# Patient Record
Sex: MALE | Race: WHITE | NOT HISPANIC OR LATINO
[De-identification: names, ages, dates, MRNs, and addresses within clinical notes are randomized per-mention and may not be internally consistent; named-entity substitution may affect disease eponyms.]

---

## 2017-12-14 ENCOUNTER — APPOINTMENT (OUTPATIENT)
Dept: OPHTHALMOLOGY | Facility: CLINIC | Age: 62
End: 2017-12-14
Payer: COMMERCIAL

## 2017-12-14 PROCEDURE — 92014 COMPRE OPH EXAM EST PT 1/>: CPT

## 2018-12-06 ENCOUNTER — APPOINTMENT (OUTPATIENT)
Dept: OPHTHALMOLOGY | Facility: CLINIC | Age: 63
End: 2018-12-06
Payer: COMMERCIAL

## 2018-12-06 DIAGNOSIS — H02.89 OTHER SPECIFIED DISORDERS OF EYELID: ICD-10-CM

## 2018-12-06 DIAGNOSIS — H26.9 UNSPECIFIED CATARACT: ICD-10-CM

## 2018-12-06 DIAGNOSIS — H43.393 OTHER VITREOUS OPACITIES, BILATERAL: ICD-10-CM

## 2018-12-06 PROCEDURE — 92014 COMPRE OPH EXAM EST PT 1/>: CPT

## 2019-12-11 ENCOUNTER — APPOINTMENT (OUTPATIENT)
Dept: OPHTHALMOLOGY | Facility: CLINIC | Age: 64
End: 2019-12-11
Payer: COMMERCIAL

## 2019-12-11 ENCOUNTER — NON-APPOINTMENT (OUTPATIENT)
Age: 64
End: 2019-12-11

## 2019-12-11 PROCEDURE — 92014 COMPRE OPH EXAM EST PT 1/>: CPT

## 2021-02-11 ENCOUNTER — TRANSCRIPTION ENCOUNTER (OUTPATIENT)
Age: 66
End: 2021-02-11

## 2021-02-11 ENCOUNTER — EMERGENCY (EMERGENCY)
Facility: HOSPITAL | Age: 66
LOS: 1 days | Discharge: HOME | End: 2021-02-11
Attending: EMERGENCY MEDICINE | Admitting: EMERGENCY MEDICINE
Payer: COMMERCIAL

## 2021-02-11 VITALS
SYSTOLIC BLOOD PRESSURE: 115 MMHG | TEMPERATURE: 101 F | HEART RATE: 70 BPM | RESPIRATION RATE: 16 BRPM | DIASTOLIC BLOOD PRESSURE: 72 MMHG | OXYGEN SATURATION: 97 %

## 2021-02-11 VITALS
OXYGEN SATURATION: 97 % | HEIGHT: 61 IN | HEART RATE: 83 BPM | WEIGHT: 175.05 LBS | TEMPERATURE: 98 F | RESPIRATION RATE: 18 BRPM | DIASTOLIC BLOOD PRESSURE: 75 MMHG | SYSTOLIC BLOOD PRESSURE: 128 MMHG

## 2021-02-11 DIAGNOSIS — R50.9 FEVER, UNSPECIFIED: ICD-10-CM

## 2021-02-11 DIAGNOSIS — U07.1 COVID-19: ICD-10-CM

## 2021-02-11 LAB — SARS-COV-2 RNA SPEC QL NAA+PROBE: POSITIVE

## 2021-02-11 PROCEDURE — M0239: CPT

## 2021-02-11 PROCEDURE — U0003: CPT

## 2021-02-11 PROCEDURE — 99284 EMERGENCY DEPT VISIT MOD MDM: CPT

## 2021-02-11 PROCEDURE — 99284 EMERGENCY DEPT VISIT MOD MDM: CPT | Mod: 25

## 2021-02-11 PROCEDURE — U0005: CPT

## 2021-02-11 RX ORDER — ACETAMINOPHEN 500 MG
650 TABLET ORAL ONCE
Refills: 0 | Status: COMPLETED | OUTPATIENT
Start: 2021-02-11 | End: 2021-02-11

## 2021-02-11 RX ORDER — SODIUM CHLORIDE 9 MG/ML
250 INJECTION INTRAMUSCULAR; INTRAVENOUS; SUBCUTANEOUS
Refills: 0 | Status: DISCONTINUED | OUTPATIENT
Start: 2021-02-11 | End: 2021-02-15

## 2021-02-11 RX ORDER — BAMLANIVIMAB 35 MG/ML
700 INJECTION, SOLUTION INTRAVENOUS ONCE
Refills: 0 | Status: COMPLETED | OUTPATIENT
Start: 2021-02-11 | End: 2021-02-11

## 2021-02-11 RX ADMIN — BAMLANIVIMAB 700 MILLIGRAM(S): 35 INJECTION, SOLUTION INTRAVENOUS at 22:42

## 2021-02-11 RX ADMIN — BAMLANIVIMAB 270 MILLIGRAM(S): 35 INJECTION, SOLUTION INTRAVENOUS at 21:35

## 2021-02-11 RX ADMIN — SODIUM CHLORIDE 25 MILLILITER(S): 9 INJECTION INTRAMUSCULAR; INTRAVENOUS; SUBCUTANEOUS at 21:35

## 2021-02-11 RX ADMIN — SODIUM CHLORIDE 250 MILLILITER(S): 9 INJECTION INTRAMUSCULAR; INTRAVENOUS; SUBCUTANEOUS at 23:42

## 2021-02-11 RX ADMIN — Medication 650 MILLIGRAM(S): at 21:26

## 2021-02-11 NOTE — ED ADULT NURSE REASSESSMENT NOTE - SYMPTOMS
none
pt states feels "great", pt stated "you know I had this pain/stiffness in my neck and now its all of a sudden gone and I have better range of motion."/none
none

## 2021-02-11 NOTE — ED PROVIDER NOTE - CLINICAL SUMMARY MEDICAL DECISION MAKING FREE TEXT BOX
Presents for MAB infusion after positive outpatient covid test, symptoms for 7 days. Well appearing, stable vs, no oxygen requirement.  Patient has history of COVID-19 symptoms for [7  ] days  Patient has high risk factors that include:  [  ] Chronic Kidney Disease [  ] Diabetes Mellitus [  ] Immune Suppressive [  ] Receiving Immunosuppressive Treatment [  ] BMI greater than or equal to 35 [X  ] Age greater than or equal 65 years    Additional Risk Factors (>55y)  [  ] Cardiovascular Disease [  ] HTN [  ] COPD/Other Chronic Respiratory Disease     Additional Risk Factors (12-17y):   [  ] Sickle Cell Disease [  ] Congenital/Acquired Heart Disease [  ] Neurodevelopmental Disorder [  ] Medical related technology dependence [  ] Asthma/Chronic Respiratory Disease [  ] Immune Suppressive Disease [  ] Receiving Immune Suppressive Therapy [  ] BMI greater than or equal to the 85th percentile    signed out to Dr. Velasco/ [facility] pending infusion and post infusion monitoring/documentation    Patient received prior COVID treatment that included [  ]  Patient provided explanation of monoclonal antibody infusion and in agreement with treatment plan. See consent form in medical record.

## 2021-02-11 NOTE — ED ADULT NURSE REASSESSMENT NOTE - NS ED NURSE REASSESS COMMENT FT1
Patient received monoclonal antibody infusion with no adverse reaction. Patient planned for d/c home and follow up with outpatient [facility] program.
Pt endorsed to RN Celt, MAB infusion in progress, 15 min and 30 min VS are stable, patient in no acute distress, no adverse reaction noted.
Pt states feels great, denies any CP/SOB/rash/hives. VS stable. Will continue to monitor.
Pt currently receiving MAB infusion, pt in no acute distress, vs stable (see flow sheet)

## 2021-02-11 NOTE — ED PROVIDER NOTE - NSFOLLOWUPINSTRUCTIONS_ED_ALL_ED_FT
Return to the Emergency Department if you have any new or worsening symptoms, or if you have any concerns.

## 2021-02-11 NOTE — ED ADULT NURSE REASSESSMENT NOTE - DISTAL EXTREMITY COLOR
7/27/2020         RE: Madison Garcia  [id number] [address] [place] [id number]        Dear Colleague,    Thank you for referring your patient, Madison Garcia, to the [facility]. Please see a copy of my visit note below.    CT sim completed    AS    Again, thank you for allowing me to participate in the care of your patient.        Sincerely,        Cassandra Santillan MD    
color consistent with ethnicity/race

## 2021-02-11 NOTE — ED PROVIDER NOTE - PATIENT PORTAL LINK FT
You can access the FollowMyHealth Patient Portal offered by [facility] by registering at the following website: [email or web address]. By joining [employer]’s FollowMyHealth portal, you will also be able to view your health information using other applications (apps) compatible with our system.

## 2021-02-11 NOTE — ED ADULT NURSE NOTE - OBJECTIVE STATEMENT
pt is 65y male, here for cough and fever x7 days, sx started same day he received covid vaccine, spouse also feeling ill and tested positive for covid

## 2021-02-11 NOTE — ED PROVIDER NOTE - PROGRESS NOTE DETAILS
[  ] Patient received monoclonal antibody infusion with no adverse reaction. Patient planned for discharge home and follow up with outpatient [facility] program.  [  ] Patient received monoclonal antibody infusion and experience an adverse reaction. Symptoms include [  ]. Patient treated with [  ]. Patient planned for [  ] T 100.6  reported by  RN -  patient reports feeling well; had higher fever earlier. [ x ] Patient received monoclonal antibody infusion with no adverse reaction. Patient planned for discharge home and follow up with outpatient [facility] program.  [  ] Patient received monoclonal antibody infusion and experience an adverse reaction. Symptoms include [  ]. Patient treated with [  ]. Patient planned for [  ]

## 2021-02-11 NOTE — ED ADULT NURSE REASSESSMENT NOTE - GENERAL PATIENT STATE
comfortable appearance/smiling/interactive

## 2021-02-11 NOTE — ED ADULT TRIAGE NOTE - NS ED NURSE BANDS TYPE
What Type Of Note Output Would You Prefer (Optional)?: Bullet Format
How Severe Are Your Spot(S)?: mild
Have Your Spot(S) Been Treated In The Past?: has not been treated
Hpi Title: Evaluation of Skin Lesions
Name band;

## 2021-02-11 NOTE — ED ADULT TRIAGE NOTE - CHIEF COMPLAINT QUOTE
Pt tested positive for covid 7 days ago, reports fever this morning and body aches, denies chest pain, sob. Pt took tylenol at 1pm. Pt came to ED requesting MAB.

## 2021-02-11 NOTE — ED ADULT NURSE NOTE - NSIMPLEMENTINTERV_GEN_ALL_ED
Implemented All Universal Safety Interventions:  [place] to call system. Call bell, personal items and telephone within reach. Instruct patient to call for assistance. Room bathroom lighting operational. Non-slip footwear when patient is off stretcher. Physically safe environment: no spills, clutter or unnecessary equipment. Stretcher in lowest position, wheels locked, appropriate side rails in place.

## 2021-02-11 NOTE — ED PROVIDER NOTE - OBJECTIVE STATEMENT
here with fever, cough, reported + covid test last week. Says he had his first covid vaccine 1 week ago. Symptoms started that day. Thought they were related to covid vaccine but didn't get better. Spouse also not feeling well so she went to urgent care and had covid test done, resulted positive. He did same and also positive. Has persistent low grade fever. Denies shortness of breath, chest pain. Not taking any medicines for his symptoms. Spoke to his doctor and advised to come for antibody infusion.

## 2021-02-12 ENCOUNTER — TRANSCRIPTION ENCOUNTER (OUTPATIENT)
Age: 66
End: 2021-02-12

## 2021-03-02 ENCOUNTER — NON-APPOINTMENT (OUTPATIENT)
Age: 66
End: 2021-03-02

## 2021-03-02 ENCOUNTER — APPOINTMENT (OUTPATIENT)
Dept: OPHTHALMOLOGY | Facility: CLINIC | Age: 66
End: 2021-03-02
Payer: COMMERCIAL

## 2021-03-02 PROCEDURE — 92014 COMPRE OPH EXAM EST PT 1/>: CPT

## 2021-03-02 PROCEDURE — 99072 ADDL SUPL MATRL&STAF TM PHE: CPT

## 2021-03-03 PROBLEM — Z78.9 OTHER SPECIFIED HEALTH STATUS: Chronic | Status: ACTIVE | Noted: 2021-02-11

## 2021-04-02 NOTE — ED PROVIDER NOTE - CONSTITUTIONAL [+], MLM
Patient called he is in a lot of pain wanting a refill but he did request it to be upped to the next dose if possible   PLEASE REVIEW FEVER

## 2021-09-11 ENCOUNTER — TRANSCRIPTION ENCOUNTER (OUTPATIENT)
Age: 66
End: 2021-09-11

## 2021-09-29 NOTE — ED PROVIDER NOTE - NS ED MD EM SELECTION
Primary  Airway intact  Breathing equal bilaterally  Circulation peripheral and central   Disability None, GCS 15  Exposure No external evidence of trauma    Secondary  CVS: S1, S2 heard  RS: Breathing equal   Abd: Soft, NT, ND  CNS: Alert, Ox3 [id number] Comprehensive

## 2021-10-31 NOTE — ED ADULT NURSE NOTE - ISOLATION TYPE:
Droplet+Contact precautions Alert-The patient is alert, awake and responds to voice. The patient is oriented to time, place, and person. The triage nurse is able to obtain subjective information.

## 2022-04-12 ENCOUNTER — APPOINTMENT (OUTPATIENT)
Dept: OPHTHALMOLOGY | Facility: CLINIC | Age: 67
End: 2022-04-12
Payer: COMMERCIAL

## 2022-04-12 ENCOUNTER — NON-APPOINTMENT (OUTPATIENT)
Age: 67
End: 2022-04-12

## 2022-04-12 PROCEDURE — 92250 FUNDUS PHOTOGRAPHY W/I&R: CPT

## 2022-04-12 PROCEDURE — 92014 COMPRE OPH EXAM EST PT 1/>: CPT

## 2023-06-21 ENCOUNTER — APPOINTMENT (OUTPATIENT)
Dept: OPHTHALMOLOGY | Facility: CLINIC | Age: 68
End: 2023-06-21

## 2023-10-02 ENCOUNTER — NON-APPOINTMENT (OUTPATIENT)
Age: 68
End: 2023-10-02

## 2023-10-02 ENCOUNTER — APPOINTMENT (OUTPATIENT)
Dept: OPHTHALMOLOGY | Facility: CLINIC | Age: 68
End: 2023-10-02
Payer: COMMERCIAL

## 2023-10-02 PROCEDURE — 92014 COMPRE OPH EXAM EST PT 1/>: CPT
